# Patient Record
Sex: MALE | Race: WHITE | NOT HISPANIC OR LATINO | URBAN - METROPOLITAN AREA
[De-identification: names, ages, dates, MRNs, and addresses within clinical notes are randomized per-mention and may not be internally consistent; named-entity substitution may affect disease eponyms.]

---

## 2020-06-08 ENCOUNTER — TELEPHONE (OUTPATIENT)
Dept: PAIN MEDICINE | Facility: CLINIC | Age: 40
End: 2020-06-08

## 2020-06-08 NOTE — TELEPHONE ENCOUNTER
Patient was contacted and given the providers availability. Patient stated he would consult with his PCP who has recommended surgery possibly

## 2020-06-08 NOTE — TELEPHONE ENCOUNTER
----- Message from Maria Alejandra Flores sent at 6/8/2020 12:39 PM CDT -----  Contact: LAURA MARQUEZ [[id number]]  Name of Who is Calling: LAURA MARQUEZ [[id number]]    What is the request in detail:Patient is requesting a call back  in regards to becoming a new Patient  Please contact to further discuss and advise      Can the clinic reply by [facility]: no     What Number to Call Back if not in [facility]:  [phone] (home)